# Patient Record
Sex: MALE | HISPANIC OR LATINO | URBAN - METROPOLITAN AREA
[De-identification: names, ages, dates, MRNs, and addresses within clinical notes are randomized per-mention and may not be internally consistent; named-entity substitution may affect disease eponyms.]

---

## 2020-08-11 ENCOUNTER — OFFICE VISIT (OUTPATIENT)
Dept: URBAN - METROPOLITAN AREA CLINIC 17 | Facility: CLINIC | Age: 45
End: 2020-08-11
Payer: COMMERCIAL

## 2020-08-11 PROCEDURE — 92002 INTRM OPH EXAM NEW PATIENT: CPT | Performed by: OPTOMETRIST

## 2020-08-11 ASSESSMENT — VISUAL ACUITY
OS: 20/20
OD: 20/20

## 2020-08-11 ASSESSMENT — INTRAOCULAR PRESSURE
OD: 11
OS: 10

## 2022-02-21 ENCOUNTER — OFFICE VISIT (OUTPATIENT)
Dept: URBAN - METROPOLITAN AREA CLINIC 17 | Facility: CLINIC | Age: 47
End: 2022-02-21
Payer: COMMERCIAL

## 2022-02-21 DIAGNOSIS — H52.4 PRESBYOPIA: Primary | ICD-10-CM

## 2022-02-21 PROCEDURE — 92012 INTRM OPH EXAM EST PATIENT: CPT | Performed by: OPTOMETRIST

## 2022-02-21 ASSESSMENT — INTRAOCULAR PRESSURE
OS: 12
OD: 12

## 2022-02-21 ASSESSMENT — VISUAL ACUITY
OD: 20/20
OS: 20/20

## 2022-02-21 NOTE — IMPRESSION/PLAN
Impression: Presbyopia: H52.4. Plan: New [employer] Rx dispensed adjustment expected. Discussed change in Rx with patient. Printed copy of Rx given to patient today.

## 2023-12-20 ENCOUNTER — OFFICE VISIT (OUTPATIENT)
Dept: URBAN - METROPOLITAN AREA CLINIC 17 | Facility: CLINIC | Age: 48
End: 2023-12-20
Payer: COMMERCIAL

## 2023-12-20 DIAGNOSIS — H52.4 PRESBYOPIA: Primary | ICD-10-CM

## 2023-12-20 PROCEDURE — 92012 INTRM OPH EXAM EST PATIENT: CPT | Performed by: OPTOMETRIST

## 2023-12-20 ASSESSMENT — INTRAOCULAR PRESSURE
OS: 11
OD: 11

## 2023-12-20 ASSESSMENT — VISUAL ACUITY
OD: 20/20
OS: 20/20

## 2023-12-20 ASSESSMENT — KERATOMETRY
OS: 42.25
OD: 42.00